# Patient Record
Sex: FEMALE | Race: WHITE | URBAN - METROPOLITAN AREA
[De-identification: names, ages, dates, MRNs, and addresses within clinical notes are randomized per-mention and may not be internally consistent; named-entity substitution may affect disease eponyms.]

---

## 2023-03-13 ENCOUNTER — OFFICE VISIT (OUTPATIENT)
Dept: PRIMARY CARE | Facility: CLINIC | Age: 53
End: 2023-03-13
Payer: COMMERCIAL

## 2023-03-13 ENCOUNTER — DOCUMENTATION (OUTPATIENT)
Dept: PRIMARY CARE | Facility: CLINIC | Age: 53
End: 2023-03-13

## 2023-03-13 VITALS
TEMPERATURE: 97.7 F | BODY MASS INDEX: 23.52 KG/M2 | DIASTOLIC BLOOD PRESSURE: 70 MMHG | OXYGEN SATURATION: 92 % | SYSTOLIC BLOOD PRESSURE: 118 MMHG | HEART RATE: 70 BPM | WEIGHT: 137 LBS

## 2023-03-13 DIAGNOSIS — B96.89 ACUTE BACTERIAL RHINOSINUSITIS: Primary | ICD-10-CM

## 2023-03-13 DIAGNOSIS — J01.90 ACUTE BACTERIAL RHINOSINUSITIS: Primary | ICD-10-CM

## 2023-03-13 PROBLEM — H69.90 EUSTACHIAN TUBE DYSFUNCTION: Status: ACTIVE | Noted: 2023-03-13

## 2023-03-13 PROBLEM — F51.02 ADJUSTMENT INSOMNIA: Status: ACTIVE | Noted: 2023-03-13

## 2023-03-13 PROBLEM — R21 SKIN RASH: Status: ACTIVE | Noted: 2023-03-13

## 2023-03-13 PROBLEM — S02.2XXA CLOSED FRACTURE OF NASAL BONES: Status: ACTIVE | Noted: 2023-03-13

## 2023-03-13 PROBLEM — G47.30 SLEEP-DISORDERED BREATHING: Status: ACTIVE | Noted: 2023-03-13

## 2023-03-13 PROBLEM — J31.0 CHRONIC RHINITIS: Status: ACTIVE | Noted: 2023-03-13

## 2023-03-13 PROBLEM — R05.9 COUGH: Status: ACTIVE | Noted: 2023-03-13

## 2023-03-13 PROBLEM — G47.33 OBSTRUCTIVE SLEEP APNEA OF ADULT: Status: ACTIVE | Noted: 2023-03-13

## 2023-03-13 PROBLEM — R06.83 PRIMARY SNORING: Status: ACTIVE | Noted: 2023-03-13

## 2023-03-13 PROCEDURE — 99213 OFFICE O/P EST LOW 20 MIN: CPT | Performed by: STUDENT IN AN ORGANIZED HEALTH CARE EDUCATION/TRAINING PROGRAM

## 2023-03-13 RX ORDER — AMOXICILLIN AND CLAVULANATE POTASSIUM 875; 125 MG/1; MG/1
875 TABLET, FILM COATED ORAL 2 TIMES DAILY
Qty: 14 TABLET | Refills: 0 | Status: SHIPPED | OUTPATIENT
Start: 2023-03-13 | End: 2023-03-20

## 2023-03-13 RX ORDER — ALBUTEROL SULFATE 90 UG/1
1-2 AEROSOL, METERED RESPIRATORY (INHALATION) EVERY 4 HOURS PRN
COMMUNITY
Start: 2022-11-11

## 2023-03-13 RX ORDER — BENZONATATE 200 MG/1
1 CAPSULE ORAL 3 TIMES DAILY PRN
COMMUNITY
Start: 2022-11-11

## 2023-03-13 RX ORDER — AZELASTINE 1 MG/ML
2 SPRAY, METERED NASAL DAILY
COMMUNITY
Start: 2021-03-02 | End: 2023-07-31 | Stop reason: SDUPTHER

## 2023-03-13 RX ORDER — ESTRADIOL 0.05 MG/D
1 FILM, EXTENDED RELEASE TRANSDERMAL 2 TIMES WEEKLY
COMMUNITY
Start: 2019-10-21

## 2023-03-13 RX ORDER — MULTIVITAMIN
TABLET ORAL
COMMUNITY

## 2023-03-13 RX ORDER — TRIAMCINOLONE ACETONIDE 1 MG/G
CREAM TOPICAL
COMMUNITY
Start: 2021-11-01

## 2023-03-13 NOTE — PROGRESS NOTES
Subjective   Patient ID: Kellie E Schwabl is a 52 y.o. female patient of Dr. Boateng with history of hysterectomy/BSO on transdermal estradiol kaveh (follows with GYN) and previous sinus infection who presents for Nasal Congestion.    HPI  Initially had nasal congestion that progressed to sinus pressure for past week. Now having drainage down the back of her throat and spitting out green mucous. Also having pressure in ears. No SOB. No fevers. Feels tired.     Taking advil cold and sinus, nasacort.     Responded well to augmentin in the past.    No sick contacts.    Objective   Visit Vitals  /70   Pulse 70   Temp 36.5 °C (97.7 °F)   Wt 62.1 kg (137 lb)   SpO2 92%   BMI 23.52 kg/m²   Smoking Status Never Assessed   BSA 1.67 m²      Physical Exam  General: Tired appearing, nasal voice, conversational, in no acute distress  HEENT: EOMI, PERRL, nasal congestion with swollen turbinates, MMM, clear mucous in oropharynx, maxillary sinuses tender to palpation, L TM with good light reflex, R TM with small amount of pus behind it, nonbulging  CV: RRR, no murmurs  Resp: Lungs CTAB, normal work of breathing  GI: Soft, nondistended, nontender, BS+   Ext: No lower ext swelling  Skin: Warm, dry, no rashes  Neuro: Awake, alert, oriented x3, moving all 4 extremities, nonfocal, normal gait, ambulates without assistance  Psych: Appropriate mood and affect          Assessment/Plan   Kellie E Schwabl is a 52 y.o. female who presents for Nasal Congestion and sinus pressure for over 7 days concerning for developing bacterial sinusitis.     Problem List Items Addressed This Visit    None  Visit Diagnoses       Acute bacterial rhinosinusitis    -  Primary    Relevant Medications    amoxicillin-pot clavulanate (Augmentin) 875-125 mg tablet                 Arabella Spivey MD

## 2023-03-13 NOTE — PATIENT INSTRUCTIONS
It was nice to see you today Susan.    For your sinus infection and developing ear infection on the right, please take augmentin twice a day for 7 days. You can also use your nasal spray to help open up the passages.    Feel better soon!

## 2023-03-13 NOTE — PROGRESS NOTES
An interactive audio and video telecommunication system which permits real time communications between the patient (at the originating site) and provider (at the distant site) was utilized to provide this telehealth service.    Subjective   Patient ID:   Kellie E Schwabl is a 52 y.o. female,  hx h/o of hysterectomy/BSO on transdermal estradiol patch (follows up with OBGYN) , of who presents for Sinus Problem (And right ear pain)    Concern sinus symptoms and ear pain  - onset 3/7/23  + some cough  - no rhinorrhea  + drain on the back to the throat  - no fever, no chills   - no nausea/vomiting  - no shortness of breath  - no known sick contact   + maxillary pressure  + worsening  in the past 7 days  + right ear pain, significantly painful   + s/p flu vaccination in 10/23, s/p COVID vaccination, at the same time on 10/23  + trial of Advil cold and sinus ,and + Nasacort    All other (10) organ systems have been reviewed, negative for significant complaint and no change from baseline other than mentioned as per HPI above.    Past Medical History:  2016: Encounter for nonprocreative screening for genetic   disease carrier status      Comment:  BRCA negative    Past Surgical History:   Procedure Laterality Date     SECTION, CLASSIC  2016     Section    CHOLECYSTECTOMY  2016    Cholecystectomy Laparoscopic    OOPHORECTOMY  2016    Oophorectomy - Unilateral (Removal Of One Ovary)    OTHER SURGICAL HISTORY  2016    Hysterectomy Robotic-Assisted    OTHER SURGICAL HISTORY  2016    Neuroplasty With Transposition Of Ulnar Nerve - At Elbow    TUBAL LIGATION  2016    Tubal Ligation       No family history on file.    Social History    has no history on file for tobacco use, alcohol use, and drug use.  Not on File  Never smoker no drug use,  no etoh       No current outpatient medications on file prior to visit.     No current facility-administered medications on file prior  to visit.       Objective     Limited exam on telehealth visit  Physical Exam  Constitutional:       General: She is awake.      Appearance: Normal appearance. She is not ill-appearing or toxic-appearing.   Pulmonary:      Effort: Pulmonary effort is normal. No respiratory distress.   Neurological:      General: No focal deficit present.      Mental Status: She is alert.   Psychiatric:         Attention and Perception: Attention normal.         Mood and Affect: Mood normal.         Speech: Speech normal.         Assessment/Plan     Kellie E Schwabl is a 52 y.o. female,  hx h/o of hysterectomy/BSO on transdermal estradiol patch (follows up with OBGYN) , of who presents for Sinus Problem (And right ear pain)    - You have congestions, runny nose, and sinus pressure however it has been less than 10 days. For symptoms that are less than 10 days it is still presumed to be viral etiology.   -  However, patient is complaining of right ear pain, and cannot wait 10 days  - advised to be seen in urgent care or PCP office for examination of the right ear   - Patient would like to cancel the visit     This visit was completed via telehealth. All issues above were discussed and addressed but no physical exam was performed. If it was felt that the patient should be evaluated in clinic then they were directed there. The patient verbally consented to visit.

## 2023-05-08 ENCOUNTER — OFFICE VISIT (OUTPATIENT)
Dept: PRIMARY CARE | Facility: CLINIC | Age: 53
End: 2023-05-08
Payer: COMMERCIAL

## 2023-05-08 DIAGNOSIS — Z94.83 DYSURIA AFTER PANCREAS TRANSPLANT USING BLADDER DRAINAGE TECHNIQUE (BDT) (MULTI): Primary | ICD-10-CM

## 2023-05-08 DIAGNOSIS — R30.0 DYSURIA AFTER PANCREAS TRANSPLANT USING BLADDER DRAINAGE TECHNIQUE (BDT) (MULTI): Primary | ICD-10-CM

## 2023-05-08 DIAGNOSIS — R30.0 DYSURIA: ICD-10-CM

## 2023-05-08 LAB
APPEARANCE, URINE: CLEAR
BILIRUBIN, URINE: NEGATIVE
BLOOD, URINE: ABNORMAL
COLOR, URINE: ABNORMAL
GLUCOSE, URINE: NEGATIVE MG/DL
KETONES, URINE: NEGATIVE MG/DL
LEUKOCYTE ESTERASE, URINE: NEGATIVE
NITRITE, URINE: NEGATIVE
PH, URINE: 5 (ref 5–8)
PROTEIN, URINE: NEGATIVE MG/DL
RBC, URINE: 1 /HPF (ref 0–5)
SPECIFIC GRAVITY, URINE: 1 (ref 1–1.03)
SQUAMOUS EPITHELIAL CELLS, URINE: 2 /HPF
UROBILINOGEN, URINE: <2 MG/DL (ref 0–1.9)
WBC, URINE: 1 /HPF (ref 0–5)

## 2023-05-08 PROCEDURE — 81001 URINALYSIS AUTO W/SCOPE: CPT

## 2023-05-08 PROCEDURE — 87086 URINE CULTURE/COLONY COUNT: CPT

## 2023-05-08 PROCEDURE — 99213 OFFICE O/P EST LOW 20 MIN: CPT | Performed by: INTERNAL MEDICINE

## 2023-05-08 RX ORDER — CIPROFLOXACIN 500 MG/1
500 TABLET ORAL 2 TIMES DAILY
Qty: 14 TABLET | Refills: 0 | Status: SHIPPED | OUTPATIENT
Start: 2023-05-08 | End: 2023-05-15

## 2023-05-08 RX ORDER — FLUCONAZOLE 150 MG/1
TABLET ORAL
Qty: 1 TABLET | Refills: 0 | Status: SHIPPED | OUTPATIENT
Start: 2023-05-08

## 2023-05-08 NOTE — PROGRESS NOTES
Kellie Schwabl presents for acute visit.     1. Irriativwe urinary symptoms   -started this wknd, while traveling, with less h20 intake than usual   +burning, noted blood in urine   -some back pain but wonders if MSK     2.  on transdermal estrogen s/p KATIE     #HM   -tdap 2018, shingrix x2  -screen labs 5.22   -mammo 4.22 with gyne   -cscope 2.21   -normal PSG in 5.21        Gen alert well appearing nad     A/P   Suspect cystitis but cannot ro pyelonephritis given gross hematuria and flank pain. UA and culture. Cipro 500 BID.

## 2023-05-09 LAB — URINE CULTURE: NORMAL

## 2023-06-27 ENCOUNTER — LAB (OUTPATIENT)
Dept: LAB | Facility: LAB | Age: 53
End: 2023-06-27
Payer: COMMERCIAL

## 2023-06-27 ENCOUNTER — OFFICE VISIT (OUTPATIENT)
Dept: PRIMARY CARE | Facility: CLINIC | Age: 53
End: 2023-06-27
Payer: COMMERCIAL

## 2023-06-27 DIAGNOSIS — R10.9 ACUTE FLANK PAIN: ICD-10-CM

## 2023-06-27 DIAGNOSIS — N20.0 NEPHROLITHIASIS: Primary | ICD-10-CM

## 2023-06-27 DIAGNOSIS — R10.9 ACUTE FLANK PAIN: Primary | ICD-10-CM

## 2023-06-27 LAB
ALBUMIN (G/DL) IN SER/PLAS: 4.7 G/DL (ref 3.4–5)
ANION GAP IN SER/PLAS: 13 MMOL/L (ref 10–20)
APPEARANCE, URINE: CLEAR
BACTERIA, URINE: ABNORMAL /HPF
BASOPHILS (10*3/UL) IN BLOOD BY AUTOMATED COUNT: 0.04 X10E9/L (ref 0–0.1)
BASOPHILS/100 LEUKOCYTES IN BLOOD BY AUTOMATED COUNT: 0.9 % (ref 0–2)
BILIRUBIN, URINE: NEGATIVE
BLOOD, URINE: ABNORMAL
CALCIUM (MG/DL) IN SER/PLAS: 9.6 MG/DL (ref 8.6–10.6)
CARBON DIOXIDE, TOTAL (MMOL/L) IN SER/PLAS: 29 MMOL/L (ref 21–32)
CHLORIDE (MMOL/L) IN SER/PLAS: 103 MMOL/L (ref 98–107)
COLOR, URINE: COLORLESS
CREATININE (MG/DL) IN SER/PLAS: 0.62 MG/DL (ref 0.5–1.05)
EOSINOPHILS (10*3/UL) IN BLOOD BY AUTOMATED COUNT: 0.11 X10E9/L (ref 0–0.7)
EOSINOPHILS/100 LEUKOCYTES IN BLOOD BY AUTOMATED COUNT: 2.5 % (ref 0–6)
ERYTHROCYTE DISTRIBUTION WIDTH (RATIO) BY AUTOMATED COUNT: 11.9 % (ref 11.5–14.5)
ERYTHROCYTE MEAN CORPUSCULAR HEMOGLOBIN CONCENTRATION (G/DL) BY AUTOMATED: 33.7 G/DL (ref 32–36)
ERYTHROCYTE MEAN CORPUSCULAR VOLUME (FL) BY AUTOMATED COUNT: 94 FL (ref 80–100)
ERYTHROCYTES (10*6/UL) IN BLOOD BY AUTOMATED COUNT: 4.47 X10E12/L (ref 4–5.2)
GFR FEMALE: >90 ML/MIN/1.73M2
GLUCOSE (MG/DL) IN SER/PLAS: 96 MG/DL (ref 74–99)
GLUCOSE, URINE: NEGATIVE MG/DL
HEMATOCRIT (%) IN BLOOD BY AUTOMATED COUNT: 41.8 % (ref 36–46)
HEMOGLOBIN (G/DL) IN BLOOD: 14.1 G/DL (ref 12–16)
IMMATURE GRANULOCYTES/100 LEUKOCYTES IN BLOOD BY AUTOMATED COUNT: 0.2 % (ref 0–0.9)
KETONES, URINE: NEGATIVE MG/DL
LEUKOCYTE ESTERASE, URINE: NEGATIVE
LEUKOCYTES (10*3/UL) IN BLOOD BY AUTOMATED COUNT: 4.4 X10E9/L (ref 4.4–11.3)
LYMPHOCYTES (10*3/UL) IN BLOOD BY AUTOMATED COUNT: 1.81 X10E9/L (ref 1.2–4.8)
LYMPHOCYTES/100 LEUKOCYTES IN BLOOD BY AUTOMATED COUNT: 41.1 % (ref 13–44)
MONOCYTES (10*3/UL) IN BLOOD BY AUTOMATED COUNT: 0.37 X10E9/L (ref 0.1–1)
MONOCYTES/100 LEUKOCYTES IN BLOOD BY AUTOMATED COUNT: 8.4 % (ref 2–10)
NEUTROPHILS (10*3/UL) IN BLOOD BY AUTOMATED COUNT: 2.06 X10E9/L (ref 1.2–7.7)
NEUTROPHILS/100 LEUKOCYTES IN BLOOD BY AUTOMATED COUNT: 46.9 % (ref 40–80)
NITRITE, URINE: NEGATIVE
NRBC (PER 100 WBCS) BY AUTOMATED COUNT: 0 /100 WBC (ref 0–0)
PH, URINE: 6 (ref 5–8)
PHOSPHATE (MG/DL) IN SER/PLAS: 3.8 MG/DL (ref 2.5–4.9)
PLATELETS (10*3/UL) IN BLOOD AUTOMATED COUNT: 256 X10E9/L (ref 150–450)
POTASSIUM (MMOL/L) IN SER/PLAS: 4 MMOL/L (ref 3.5–5.3)
PROTEIN, URINE: NEGATIVE MG/DL
RBC, URINE: 1 /HPF (ref 0–5)
SODIUM (MMOL/L) IN SER/PLAS: 141 MMOL/L (ref 136–145)
SPECIFIC GRAVITY, URINE: 1 (ref 1–1.03)
SQUAMOUS EPITHELIAL CELLS, URINE: <1 /HPF
UREA NITROGEN (MG/DL) IN SER/PLAS: 13 MG/DL (ref 6–23)
UROBILINOGEN, URINE: <2 MG/DL (ref 0–1.9)
WBC, URINE: 1 /HPF (ref 0–5)

## 2023-06-27 PROCEDURE — 81001 URINALYSIS AUTO W/SCOPE: CPT

## 2023-06-27 PROCEDURE — 85025 COMPLETE CBC W/AUTO DIFF WBC: CPT

## 2023-06-27 PROCEDURE — 80069 RENAL FUNCTION PANEL: CPT

## 2023-06-27 PROCEDURE — 36415 COLL VENOUS BLD VENIPUNCTURE: CPT

## 2023-06-27 PROCEDURE — 99214 OFFICE O/P EST MOD 30 MIN: CPT | Performed by: INTERNAL MEDICINE

## 2023-06-27 PROCEDURE — 87086 URINE CULTURE/COLONY COUNT: CPT

## 2023-06-27 RX ORDER — TAMSULOSIN HYDROCHLORIDE 0.4 MG/1
0.4 CAPSULE ORAL DAILY
Qty: 30 CAPSULE | Refills: 11 | Status: SHIPPED | OUTPATIENT
Start: 2023-06-27 | End: 2023-07-20

## 2023-06-27 NOTE — PROGRESS NOTES
Susan presents with concern for UTI. She presented similarly in May with irritative urinary symptoms but these were fairly classic and resolved with empiric abx, though urine culture proved sterile. Today she presents with 2-3 days of L flank pain, colicky in nature, with radiation into the groin. She denies byron UTI sxs. She denies fever, malaise. No h/o nephrolithiasis in the past.     ROS:   Gen neg fever neg malaise    neg dysuria neg hematuria     Exam with mild L CVAT no abd TTP nontoxic appearing     A/P   This is a case of acute L flank pain radiating into groin concerning for nephrolithiasis.   -UA, culture, CBC, RFP and stat CT AP   -discussed hydration, ibuprofen 800 TID PRN pain with meals   -further mgmt as per CT and labs/urine

## 2023-06-28 LAB — URINE CULTURE: NORMAL

## 2023-07-06 DIAGNOSIS — N20.0 NEPHROLITHIASIS: Primary | ICD-10-CM

## 2023-07-19 DIAGNOSIS — N20.0 NEPHROLITHIASIS: ICD-10-CM

## 2023-07-20 RX ORDER — TAMSULOSIN HYDROCHLORIDE 0.4 MG/1
CAPSULE ORAL
Qty: 30 CAPSULE | Refills: 11 | Status: SHIPPED | OUTPATIENT
Start: 2023-07-20

## 2023-07-25 ENCOUNTER — OFFICE VISIT (OUTPATIENT)
Dept: PRIMARY CARE | Facility: CLINIC | Age: 53
End: 2023-07-25
Payer: COMMERCIAL

## 2023-07-25 DIAGNOSIS — N20.0 NEPHROLITHIASIS: Primary | ICD-10-CM

## 2023-07-25 PROCEDURE — 99213 OFFICE O/P EST LOW 20 MIN: CPT | Performed by: INTERNAL MEDICINE

## 2023-07-25 NOTE — PROGRESS NOTES
Kellie Schwabl is a 53 yo F presenting in FU.     1. Nephrolithiasis 6.2023.   -distal L ureter 7 mm without hydro at time of 6.27.23 visit with me   -does not feel has passed a stone, does not resolved pain and has 30 days supply of tamsulosin rx'ed 6.27.23 almost now done   -has FU with uro pending     2.  on transdermal estrogen (h/o KATIE)     3. Pulm nodule potentially on 6.23 CT AP rec'ed FU     #HM   -tdap 2018   -shingrix x2   -cscope 2.21   -normal psg 5.21   -mammo due   -cbc/cmp 6.23       ROS:    neg dysuria neg hematuria   Gen neg fever neg malaise     O   VSS   Exam without CVAT well appearing       A/P   -obtain renal US   -complete 30 days tamsulosin   -Uro FU

## 2023-07-28 ENCOUNTER — TELEPHONE (OUTPATIENT)
Dept: PRIMARY CARE | Facility: CLINIC | Age: 53
End: 2023-07-28
Payer: COMMERCIAL

## 2023-07-31 DIAGNOSIS — J01.90 ACUTE BACTERIAL RHINOSINUSITIS: Primary | ICD-10-CM

## 2023-07-31 DIAGNOSIS — B96.89 ACUTE BACTERIAL RHINOSINUSITIS: Primary | ICD-10-CM

## 2023-07-31 RX ORDER — AZELASTINE 1 MG/ML
2 SPRAY, METERED NASAL DAILY
Qty: 30 ML | Refills: 11 | Status: SHIPPED | OUTPATIENT
Start: 2023-07-31

## 2023-10-20 ENCOUNTER — ANCILLARY PROCEDURE (OUTPATIENT)
Dept: RADIOLOGY | Facility: CLINIC | Age: 53
End: 2023-10-20
Payer: COMMERCIAL

## 2023-10-20 DIAGNOSIS — Z12.39 ENCOUNTER FOR OTHER SCREENING FOR MALIGNANT NEOPLASM OF BREAST: ICD-10-CM

## 2023-10-20 PROCEDURE — 77063 BREAST TOMOSYNTHESIS BI: CPT | Mod: BILATERAL PROCEDURE | Performed by: RADIOLOGY

## 2023-10-20 PROCEDURE — 77063 BREAST TOMOSYNTHESIS BI: CPT | Mod: 50

## 2023-10-20 PROCEDURE — 77067 SCR MAMMO BI INCL CAD: CPT | Mod: BILATERAL PROCEDURE | Performed by: RADIOLOGY

## 2023-11-01 ENCOUNTER — APPOINTMENT (OUTPATIENT)
Dept: PRIMARY CARE | Facility: CLINIC | Age: 53
End: 2023-11-01
Payer: COMMERCIAL

## 2025-01-01 ENCOUNTER — TELEMEDICINE (OUTPATIENT)
Dept: PRIMARY CARE | Facility: CLINIC | Age: 55
End: 2025-01-01
Payer: COMMERCIAL

## 2025-01-01 DIAGNOSIS — H10.12: Primary | ICD-10-CM

## 2025-01-01 PROCEDURE — 99213 OFFICE O/P EST LOW 20 MIN: CPT

## 2025-01-01 RX ORDER — OFLOXACIN 3 MG/ML
1 SOLUTION/ DROPS OPHTHALMIC 4 TIMES DAILY
Qty: 5 ML | Refills: 0 | Status: SHIPPED | OUTPATIENT
Start: 2025-01-01

## 2025-01-01 ASSESSMENT — ENCOUNTER SYMPTOMS
DOUBLE VISION: 0
EYE PAIN: 0
EYE REDNESS: 1
EYE DISCHARGE: 1
EYE ITCHING: 1

## 2025-01-01 NOTE — PATIENT INSTRUCTIONS
"Please take eye drops 4 times per day for the next 5 days    Remember to wipe down all the high touch areas in the house which include but are not limited to door handle on the fridge, handle on the toilet, door knobs of house, bathrooms, and bed rooms.     What is pink eye?  Pink eye is a term people use to describe an infection or irritation of the eye. The medical term for pink eye is \"conjunctivitis.\"  If you have pink eye, your eye (or eyes) might:  ?Turn pink or red  ?Weep or ooze a gooey liquid  ?Become itchy or burn  ?Get stuck shut, especially when you first wake up  Pink eye can be caused by an infection, allergies, or an unknown irritation    When can I go back to work or school?  If you have pink eye caused by an infection, remember that it can spread very easily. The best way to avoid spreading it is to stay away from other people until you no longer have symptoms. If this is not possible, wash your hands often, It's also important to avoid touching your eyes and sharing items that could spread the infection.  Schools and day cares usually have rules about when a child with pink eye can return. If a child has a bacterial infection, they will probably need to stay home until they have gotten antibiotic eye drops or ointment for 24 hours.   "

## 2025-01-01 NOTE — PROGRESS NOTES
On Demand Virtual Visit Patient Consent     This visit was completed via video conference. All issues as below were discussed and addressed but no physical exam was performed. If it was felt that the patient should be evaluated in clinic than they were directed there. The patient verbally consented to the visit.    An interactive audio and video telecommunication system which permits real time communications between the patient (at the originating site) and provider (at the distant site) was utilized to provide this telehealth service.   Verbal consent was requested and obtained from Kellie E Schwabl (or parent if under 18) 01/01/25 for a telehealth visit.   I have verbally confirmed with Kellie E Schwabl (or parent if under 18) that they are physically located in the [facility] during this virtual visit.    Subjective   Patient ID: Kellie E Schwabl is a 54 y.o. female who presents for Conjunctivitis.  Conjunctivitis   The current episode started yesterday. The onset was sudden. The problem occurs continuously. The problem has been gradually worsening. Nothing relieves the symptoms. Nothing aggravates the symptoms. Associated symptoms include eye itching, eye discharge and eye redness. Pertinent negatives include no decreased vision, no double vision, no ear discharge, no ear pain and no eye pain. The left eye is affected. The eye pain is not associated with movement. The eyelid exhibits no abnormality.       Review of Systems   HENT:  Negative for ear discharge and ear pain.    Eyes:  Positive for discharge, redness and itching. Negative for double vision and pain.       Objective     There were no vitals taken for this visit.       Physical Exam  Constitutional:       General: She is not in acute distress.     Appearance: She is normal weight. She is not ill-appearing.   Eyes:      General: Lids are normal.      Conjunctiva/sclera:      Right eye: Right conjunctiva is not injected.      Left eye: Left  conjunctiva is injected. Exudate present.   Pulmonary:      Effort: Pulmonary effort is normal.   Neurological:      Mental Status: She is alert.       Pt seen via video feed to be in no acute distress  Reviewed use of otc/supportive care and sent via pt instruct   All questions were answered and need for follow-up/in person care was reviewed.      Assessment/Plan   Susan was seen today for conjunctivitis.  Diagnoses and all orders for this visit:  Acute atopic conjunctivitis, left  -     ofloxacin (Ocuflox) 0.3 % ophthalmic solution; Administer 1 drop into the left eye 4 times a day.

## 2025-01-02 ENCOUNTER — APPOINTMENT (OUTPATIENT)
Dept: OBSTETRICS AND GYNECOLOGY | Facility: CLINIC | Age: 55
End: 2025-01-02
Payer: COMMERCIAL

## 2025-01-22 ENCOUNTER — APPOINTMENT (OUTPATIENT)
Dept: OBSTETRICS AND GYNECOLOGY | Facility: CLINIC | Age: 55
End: 2025-01-22
Payer: COMMERCIAL

## 2025-01-22 VITALS
SYSTOLIC BLOOD PRESSURE: 120 MMHG | DIASTOLIC BLOOD PRESSURE: 68 MMHG | BODY MASS INDEX: 23.73 KG/M2 | WEIGHT: 139 LBS | HEIGHT: 64 IN

## 2025-01-22 DIAGNOSIS — Z01.419 WELL WOMAN EXAM: Primary | ICD-10-CM

## 2025-01-22 DIAGNOSIS — Z79.890 HORMONE REPLACEMENT THERAPY: Primary | ICD-10-CM

## 2025-01-22 DIAGNOSIS — E89.40 ASYMPTOMATIC POSTPROCEDURAL OVARIAN FAILURE: ICD-10-CM

## 2025-01-22 DIAGNOSIS — Z12.31 BREAST CANCER SCREENING BY MAMMOGRAM: ICD-10-CM

## 2025-01-22 DIAGNOSIS — Z79.890 HORMONE REPLACEMENT THERAPY: ICD-10-CM

## 2025-01-22 PROCEDURE — 3008F BODY MASS INDEX DOCD: CPT | Performed by: OBSTETRICS & GYNECOLOGY

## 2025-01-22 PROCEDURE — 99396 PREV VISIT EST AGE 40-64: CPT | Performed by: OBSTETRICS & GYNECOLOGY

## 2025-01-22 PROCEDURE — 1036F TOBACCO NON-USER: CPT | Performed by: OBSTETRICS & GYNECOLOGY

## 2025-01-22 RX ORDER — ESTRADIOL 0.05 MG/D
1 FILM, EXTENDED RELEASE TRANSDERMAL WEEKLY
Qty: 12 PATCH | Refills: 3 | Status: SHIPPED | OUTPATIENT
Start: 2025-01-22 | End: 2026-01-22

## 2025-01-22 RX ORDER — ESTRADIOL 0.05 MG/D
1 FILM, EXTENDED RELEASE TRANSDERMAL 2 TIMES WEEKLY
Qty: 24 PATCH | Refills: 3 | Status: SHIPPED | OUTPATIENT
Start: 2025-01-23 | End: 2026-01-23

## 2025-01-22 ASSESSMENT — ENCOUNTER SYMPTOMS
COLOR CHANGE: 0
VOMITING: 0
SLEEP DISTURBANCE: 0
ABDOMINAL PAIN: 0
HEMATURIA: 0
FREQUENCY: 0
SHORTNESS OF BREATH: 0
FLANK PAIN: 0
BLOOD IN STOOL: 0
CONSTIPATION: 0
NAUSEA: 0
ABDOMINAL DISTENTION: 0
APPETITE CHANGE: 0
CHILLS: 0
FEVER: 0
FATIGUE: 0
BACK PAIN: 0
UNEXPECTED WEIGHT CHANGE: 0
DYSURIA: 0
DIARRHEA: 0

## 2025-01-22 ASSESSMENT — PAIN SCALES - GENERAL: PAINLEVEL_OUTOF10: 0-NO PAIN

## 2025-01-22 NOTE — PROGRESS NOTES
"History Of Present Illness  Routine Gyn Exam  Susan E Schwabl here for routine WWE.  Pt is postmenopausal.  Denies spotting or bleeding.   Pt s/p hysterectomy and subsequent BSO (around ).  Currently using estradiol patch.     Concerns: none.     Medical and surgical histories reviewed with patient.   Exercise: recently started with a  - weights, cardio, yoga .  2 daughters     Gynecologic History  Postmenopausal.  Sexually active: Yes with one male partner/ .  Last Pap: hysterectomy .   Last mammogram: .   Last Colonoscopy:  .       Obstetric History  OB History    Para Term  AB Living   2         2   SAB IAB Ectopic Multiple Live Births                  # Outcome Date GA Lbr Henry/2nd Weight Sex Type Anes PTL Lv   2             1                  Review of Systems   Constitutional:  Negative for appetite change, chills, fatigue, fever and unexpected weight change.   Respiratory:  Negative for shortness of breath.    Cardiovascular:  Negative for chest pain.   Gastrointestinal:  Negative for abdominal distention, abdominal pain, blood in stool, constipation, diarrhea, nausea and vomiting.   Endocrine: Negative for cold intolerance and heat intolerance.   Genitourinary:  Negative for dyspareunia, dysuria, flank pain, frequency, genital sores, hematuria, menstrual problem, pelvic pain, urgency, vaginal bleeding, vaginal discharge and vaginal pain.   Musculoskeletal:  Negative for back pain.   Skin:  Negative for color change.   Psychiatric/Behavioral:  Negative for sleep disturbance.        /68 (BP Location: Left arm, Patient Position: Sitting)   Ht 1.626 m (5' 4\")   Wt 63 kg (139 lb)   LMP  (LMP Unknown)   BMI 23.86 kg/m²      Physical Exam  Constitutional:       Appearance: Normal appearance.   HENT:      Head: Normocephalic and atraumatic.   Chest:   Breasts:     Right: Normal.      Left: Normal.   Abdominal:      General: Abdomen is flat.     "  Palpations: Abdomen is soft.      Tenderness: There is no abdominal tenderness.   Genitourinary:     General: Normal vulva.      Vagina: Normal.      Uterus: Absent.       Adnexa: Right adnexa normal and left adnexa normal.      Comments: Cervix and uterus surgically absent  Vaginal cuff wnl     Skin:     General: Skin is warm and dry.   Neurological:      Mental Status: She is alert and oriented to person, place, and time.   Psychiatric:         Mood and Affect: Mood normal.              Assessment/Plan         Routine Well Woman Exam Today  Discussed diet and exercise.   Reviewed routine health screenings.   Pap: Pap no longer needed due to history of hysterectomy for benign reasons. Pt denies history of cervical dysplasia.     Recommend annual mammograms. Pt overdue, last mammogram 2023.  Order placed and patient will schedule.    Surgical menopause  Pt plans to continue estradiol patch for now. Rx sent.                Brenda Muniz MD

## 2025-02-16 ENCOUNTER — OFFICE VISIT (OUTPATIENT)
Dept: URGENT CARE | Age: 55
End: 2025-02-16
Payer: COMMERCIAL

## 2025-02-16 VITALS
DIASTOLIC BLOOD PRESSURE: 76 MMHG | HEART RATE: 58 BPM | WEIGHT: 135 LBS | BODY MASS INDEX: 23.05 KG/M2 | OXYGEN SATURATION: 95 % | TEMPERATURE: 97.7 F | HEIGHT: 64 IN | SYSTOLIC BLOOD PRESSURE: 121 MMHG

## 2025-02-16 DIAGNOSIS — J10.1 INFLUENZA A: Primary | ICD-10-CM

## 2025-02-16 DIAGNOSIS — R05.1 ACUTE COUGH: ICD-10-CM

## 2025-02-16 LAB
POC RAPID INFLUENZA A: POSITIVE
POC RAPID INFLUENZA B: NEGATIVE
POC SARS-COV-2 AG BINAX: NORMAL

## 2025-02-16 RX ORDER — BENZONATATE 200 MG/1
200 CAPSULE ORAL 3 TIMES DAILY PRN
Qty: 30 CAPSULE | Refills: 0 | Status: SHIPPED | OUTPATIENT
Start: 2025-02-16 | End: 2025-02-23

## 2025-02-16 RX ORDER — OSELTAMIVIR PHOSPHATE 75 MG/1
75 CAPSULE ORAL EVERY 12 HOURS
Qty: 10 CAPSULE | Refills: 0 | Status: SHIPPED | OUTPATIENT
Start: 2025-02-16 | End: 2025-02-16

## 2025-02-16 RX ORDER — OSELTAMIVIR PHOSPHATE 75 MG/1
75 CAPSULE ORAL EVERY 12 HOURS
Qty: 10 CAPSULE | Refills: 0 | Status: SHIPPED | OUTPATIENT
Start: 2025-02-16 | End: 2025-02-21

## 2025-02-16 RX ORDER — GUAIFENESIN 1200 MG/1
1200 TABLET, EXTENDED RELEASE ORAL EVERY 12 HOURS PRN
Qty: 14 TABLET | Refills: 0 | Status: SHIPPED | OUTPATIENT
Start: 2025-02-16 | End: 2025-02-23

## 2025-02-16 ASSESSMENT — ENCOUNTER SYMPTOMS: COUGH: 1

## 2025-02-16 NOTE — PROGRESS NOTES
"Subjective   Patient ID: Kellie E Schwabl is a 54 y.o. female. They present today with a chief complaint of Cough (Symptoms for 4 days. ).    History of Present Illness    Cough        Past Medical History  Allergies as of 2025 - Reviewed 2025   Allergen Reaction Noted    Codeine Other 2025    Erythromycin Nausea/vomiting 2025       (Not in a hospital admission)       Past Medical History:   Diagnosis Date    Encounter for nonprocreative screening for genetic disease carrier status 2016    BRCA negative       Past Surgical History:   Procedure Laterality Date     SECTION, CLASSIC  2016     Section    CHOLECYSTECTOMY  2016    Cholecystectomy Laparoscopic    OOPHORECTOMY  2016    Oophorectomy - Unilateral (Removal Of One Ovary)    OTHER SURGICAL HISTORY  2016    Hysterectomy Robotic-Assisted    OTHER SURGICAL HISTORY  2016    Neuroplasty With Transposition Of Ulnar Nerve - At Elbow    TUBAL LIGATION  2016    Tubal Ligation        reports that she has never smoked. She has never been exposed to tobacco smoke. She has never used smokeless tobacco. She reports current alcohol use of about 2.0 standard drinks of alcohol per week. She reports that she does not use drugs.    Review of Systems  Review of Systems   Respiratory:  Positive for cough.    All other systems reviewed and are negative.                                 Objective    Vitals:    25 0802   BP: 121/76   BP Location: Left arm   Patient Position: Sitting   BP Cuff Size: Adult   Pulse: 58   Temp: 36.5 °C (97.7 °F)   TempSrc: Oral   SpO2: 95%   Weight: 61.2 kg (135 lb)   Height: 1.626 m (5' 4\")     No LMP recorded (lmp unknown). Patient has had a hysterectomy.    Physical Exam  Vitals and nursing note reviewed.   Constitutional:       Appearance: Normal appearance.   HENT:      Head: Normocephalic.      Right Ear: Tympanic membrane normal.      Left Ear: Tympanic membrane " normal.      Nose: Nose normal.      Mouth/Throat:      Mouth: Mucous membranes are dry.      Pharynx: Oropharynx is clear.   Eyes:      Extraocular Movements: Extraocular movements intact.      Pupils: Pupils are equal, round, and reactive to light.   Cardiovascular:      Rate and Rhythm: Normal rate and regular rhythm.      Pulses: Normal pulses.      Heart sounds: Normal heart sounds.   Pulmonary:      Effort: Pulmonary effort is normal.      Breath sounds: Normal breath sounds and air entry. No decreased breath sounds, wheezing or rhonchi.   Musculoskeletal:         General: Normal range of motion.      Cervical back: Normal range of motion and neck supple.   Lymphadenopathy:      Cervical: Cervical adenopathy present.   Skin:     General: Skin is warm and dry.   Neurological:      General: No focal deficit present.      Mental Status: She is alert and oriented to person, place, and time.   Psychiatric:         Mood and Affect: Mood normal.         Behavior: Behavior normal.         Procedures    Point of Care Test & Imaging Results from this visit  Results for orders placed or performed in visit on 02/16/25   POCT Covid-19 Rapid Antigen   Result Value Ref Range    POC ROXANA-COV-2 AG  Presumptive negative test for SARS-CoV-2 (no antigen detected)     Presumptive negative test for SARS-CoV-2 (no antigen detected)   POCT Influenza A/B manually resulted   Result Value Ref Range    POC Rapid Influenza A Positive (A) Negative    POC Rapid Influenza B Negative Negative      No results found.    Diagnostic study results (if any) were reviewed by YULISSA Tabares.    Assessment/Plan   Allergies, medications, history, and pertinent labs/EKGs/Imaging reviewed by YULISSA Tabares.     Medical Decision Making    Rapid covid- neg  Influenza A- POSITIVE  Tamiflu prescribed at   Pt managing secretions, airway intact. There or no signs of PTA/TA, trismus, retropharyngeal abscess or epiglotitis. No signs of  osteomyelitis, orbital cellulitis or other complications of sinusitis at this point in time. CXR deferred at this time as lungs are CTA and there is no signs of respiratory distress. SpO2 >94% on RA. There is no reported SOB, CP, CASTILLO, pleuritic pain, fever. Clinical suspicions of pneumonia or other cardiorespiratory catastrophe at this time are low. Pt is ambulating without difficulty showing no signs of hypoxia. Given the pt underlying risk factors, and exam will err on the side of caution and send over tamiflu . Pt well-hydrated, nontoxic and there no signs of clinical deterioration. Patient well hydrated, neurovascularly intact. Advised normal saline mist spray TID, flonase daily, antihistamine daily, vit d3/c/zinc/elderberry, probiotics for gut health, and if s/s persist after 48 hours to f/u PCP    Follow up Care: Pt instructed to follow-up with PCP or other appropriate clinician within 24 to 48 hours. Report to ED if there is any development in worsening pain, difficulty swallowing, change in phonation, fever, chills, neck pain, photophobia, headache, neck stiffness, chest pain, abdominal pain, vomiting, syncope, hemoptysis, leg swelling SOB, fever, facial swelling, eye pain, periorbital swelling/erythema, or any new signs or sx.     The patient was educated regarding diagnosis, supportive care, OTC and Rx medications. The patient was given the opportunity to ask questions prior to discharge. They verbalized understanding of my discussion of the plans for treatment, expected course, indications to return to UC or seek further evaluation in ED, and the need for timely follow up as directed.     Orders and Diagnoses  Diagnoses and all orders for this visit:  Influenza A  -     oseltamivir (Tamiflu) 75 mg capsule; Take 1 capsule (75 mg) by mouth every 12 hours for 5 days.  -     benzonatate (Tessalon) 200 mg capsule; Take 1 capsule (200 mg) by mouth 3 times a day as needed for cough for up to 7 days. Do not crush  or chew.  -     guaiFENesin (Mucinex) 1,200 mg tablet extended release 12hr; Take 1 tablet (1,200 mg) by mouth every 12 hours if needed (cough) for up to 7 days.  Acute cough  -     POCT Covid-19 Rapid Antigen  -     POCT Influenza A/B manually resulted      Medical Admin Record      Patient disposition: Home    Electronically signed by YULISSA Tabares  8:28 AM

## 2025-02-17 ENCOUNTER — APPOINTMENT (OUTPATIENT)
Dept: RADIOLOGY | Facility: CLINIC | Age: 55
End: 2025-02-17
Payer: COMMERCIAL

## 2025-02-19 ENCOUNTER — OFFICE VISIT (OUTPATIENT)
Dept: PRIMARY CARE | Facility: CLINIC | Age: 55
End: 2025-02-19
Payer: COMMERCIAL

## 2025-02-19 VITALS
HEART RATE: 81 BPM | HEIGHT: 64 IN | WEIGHT: 139 LBS | DIASTOLIC BLOOD PRESSURE: 84 MMHG | BODY MASS INDEX: 23.73 KG/M2 | OXYGEN SATURATION: 98 % | SYSTOLIC BLOOD PRESSURE: 143 MMHG

## 2025-02-19 DIAGNOSIS — B96.89 BACTERIAL SINUSITIS: Primary | ICD-10-CM

## 2025-02-19 DIAGNOSIS — B37.9 YEAST INFECTION: ICD-10-CM

## 2025-02-19 DIAGNOSIS — J32.9 BACTERIAL SINUSITIS: Primary | ICD-10-CM

## 2025-02-19 PROCEDURE — 1036F TOBACCO NON-USER: CPT | Performed by: STUDENT IN AN ORGANIZED HEALTH CARE EDUCATION/TRAINING PROGRAM

## 2025-02-19 PROCEDURE — 99213 OFFICE O/P EST LOW 20 MIN: CPT | Performed by: STUDENT IN AN ORGANIZED HEALTH CARE EDUCATION/TRAINING PROGRAM

## 2025-02-19 PROCEDURE — 3008F BODY MASS INDEX DOCD: CPT | Performed by: STUDENT IN AN ORGANIZED HEALTH CARE EDUCATION/TRAINING PROGRAM

## 2025-02-19 RX ORDER — AMOXICILLIN AND CLAVULANATE POTASSIUM 875; 125 MG/1; MG/1
875 TABLET, FILM COATED ORAL 2 TIMES DAILY
Qty: 14 TABLET | Refills: 0 | Status: SHIPPED | OUTPATIENT
Start: 2025-02-19 | End: 2025-02-26

## 2025-02-19 RX ORDER — FLUCONAZOLE 150 MG/1
TABLET ORAL
Qty: 2 TABLET | Refills: 0 | Status: SHIPPED | OUTPATIENT
Start: 2025-02-19

## 2025-02-19 ASSESSMENT — PATIENT HEALTH QUESTIONNAIRE - PHQ9
1. LITTLE INTEREST OR PLEASURE IN DOING THINGS: NOT AT ALL
2. FEELING DOWN, DEPRESSED OR HOPELESS: NOT AT ALL
SUM OF ALL RESPONSES TO PHQ9 QUESTIONS 1 AND 2: 0

## 2025-02-19 ASSESSMENT — PAIN SCALES - GENERAL: PAINLEVEL_OUTOF10: 6

## 2025-02-19 NOTE — PROGRESS NOTES
Subjective    Kellie E Schwabl is a 54 y.o. female [place] [facility] [place]o Rolon on 25 for sick visit for flu-like symptoms and sinus issues. Pt is a former pt of Dr Boateng yet to establish care.    HPI:   Acute Concerns:   #Flu A positive  #sinus infection  - initially symptoms of cough and sore throat, now improved with tamiflu/supportive care  - now with 2 days of intense of sinus pain/pressure and bilateral ear pain   - denies hearing symptoms or ringing in ear  - some nasal drainage- clear, not coughing up any significant mucus   - no continued fevers/chills  [  ] continue tamiflu course  [  ] encouraged supportive measures with daily antihistamine, daily steroid nasal spray, mucinex as needed  [  ] Augmentin BID 7 days for c/f otitis media  [  ] fluconazole script sent in case of yeast infection given abx    Past Medical History:   Past Medical History:   Diagnosis Date    Encounter for nonprocreative screening for genetic disease carrier status 2016    BRCA negative     Subspecialty Medical Care: Obgyn  Interval Subspecialty Care Visit History:     Past Surgical History:   Past Surgical History:   Procedure Laterality Date     SECTION, CLASSIC  2016     Section    CHOLECYSTECTOMY  2016    Cholecystectomy Laparoscopic    OOPHORECTOMY  2016    Oophorectomy - Unilateral (Removal Of One Ovary)    OTHER SURGICAL HISTORY  2016    Hysterectomy Robotic-Assisted    OTHER SURGICAL HISTORY  2016    Neuroplasty With Transposition Of Ulnar Nerve - At Elbow    TUBAL LIGATION  2016    Tubal Ligation     Surgery/Location/Date:     Medications:     Current Outpatient Medications:     benzonatate (Tessalon) 200 mg capsule, Take 1 capsule (200 mg) by mouth 3 times a day as needed for cough for up to 7 days. Do not crush or chew., Disp: 30 capsule, Rfl: 0    estradiol (Vivelle-DOT) 0.05 mg/24 hr patch, Place 1 patch on the skin 1 (one) time per  "week., Disp: 12 patch, Rfl: 3    estradiol (Vivelle-DOT) 0.05 mg/24 hr patch, Place 1 patch over 96 hours on the skin 2 times a week., Disp: 24 patch, Rfl: 3    guaiFENesin (Mucinex) 1,200 mg tablet extended release 12hr, Take 1 tablet (1,200 mg) by mouth every 12 hours if needed (cough) for up to 7 days., Disp: 14 tablet, Rfl: 0    multivitamin tablet, Take by mouth., Disp: , Rfl:     ofloxacin (Ocuflox) 0.3 % ophthalmic solution, Administer 1 drop into the left eye 4 times a day. (Patient not taking: Reported on 2/16/2025), Disp: 5 mL, Rfl: 0    oseltamivir (Tamiflu) 75 mg capsule, Take 1 capsule (75 mg) by mouth every 12 hours for 5 days., Disp: 10 capsule, Rfl: 0  Pharmacy: [facility] [place] [address]    Allergies:   Allergies   Allergen Reactions    Codeine Other    Erythromycin Nausea/vomiting     Immunizations:     Family History:   Family History   Problem Relation Name Age of Onset    Stroke Father      Breast cancer Sister          2 sisters, both in 40s when diagnosed       Social History: - will address at [facility] care visit   Home/Living Situation/Falls/Safety Assessment:   Education/Employment/Work/Vocational:   Activities:   Drug Use:   Diet:   Depression/Anxiety:   Sexuality/Contraception/Menstrual History:   Sleep:     Patient Information:  Health Insurance:  Employee Medical   Transportation: drives self  Healthcare POA/Guardian: , Tariq  Contact Information:   Other:     Visit Vitals  /84 (BP Location: Right arm, Patient Position: Sitting, BP Cuff Size: Adult)   Pulse 81   Ht 1.626 m (5' 4\")   Wt 63 kg (139 lb)   LMP  (LMP Unknown)   SpO2 98%   BMI 23.86 kg/m²   OB Status Hysterectomy   Smoking Status Never   BSA 1.69 m²        PHYSICAL EXAM:   General: well appearing, NAD, pleasant and engaged in encounter    HEENT: NCAT, MMM, no posterior pharyngeal erythema/exudates appreciated; bilateral swollen nasal turbinates; no light reflex appreciated on R>L, pain on examination of ear "   CV: RRR, no m/r/g  PULM: CTAB, non-labored respirations   ABD: soft, NT, ND, + bowel sounds   : no suprapubic or CVA tenderness   EXT: WWP, no significant edema   SKIN: no rashes noted   NEURO: A&Ox4, symmetric facies, no gross motor or sensory deficits, normal gait  PSYCH: pleasant mood, appropriate affect     Assessment/Plan    Kellie E Schwabl is a 54 y.o. F seen in Clinic at [facility] [place] by Dr. Jericho Rolon on 25 for sick visit for flu-like symptoms and sinus issues. Pt is a former pt of Dr Boateng yet to establish care.    #Flu A positive  #sinus infection  - initially symptoms of cough and sore throat, now improved with tamiflu/supportive care  - now with 2 days of intense of sinus pain/pressure and bilateral ear pain   - denies hearing symptoms or ringing in ear  - some nasal drainage- clear, not coughing up any significant mucus   - no continued fevers/chills  [  ] continue tamiflu course  [  ] encouraged supportive measures with daily antihistamine, daily steroid nasal spray, mucinex as needed  [  ] Augmentin BID 7 days for c/f otitis media  [  ] fluconazole script sent in case of yeast infection given abx    #Health Maintenance    Cancer Screening  - Cervical Cancer Screening: last pap smear/HPV testing: hysterectomy  - Mammography: Last 10/2023, due now, ordered pending   - Colorectal Cancer Screenin, due   - Lung Cancer Screening: not indic    Laboratory Screening- address at established care visit   - Lipid Screen:   - ASCVD Score:   - A1C, glucose screen:   - STI, HIV, Hep B screen:   - Hep C screen:     Imaging Screening  - Osteoporosis/DEXA screening: due at 65    Immunizations:   - Influenza - UTD fall   - COVID - UTD fall   - Tdap- done , due    - Prevnar, Pneumovax: not done, consider prevnar 20 in future  - Shingrix: consider further discussion at next visit     Other Screening  - Health Literacy Assessment: excellent  - Depression screen:   - Home safety/partner  violence screen:   - Hearing/Vision screens:   - Alcohol/tobacco/drug use screen:   - Healthcare POA/Advanced Directives:     Referrals: augmentin, fluconazole  Return to clinic to establish care.     Patient Discussion:    Please call back the office with any questions at [phone]. In the case of an emergency, please call 911 or go to the nearest Emergency Department.      Jericho Rolon MD  Internal Medicine-Pediatrics  [facility] [place] [address], [address]  P: [phone], F: [phone]

## 2025-03-05 ENCOUNTER — HOSPITAL ENCOUNTER (OUTPATIENT)
Dept: RADIOLOGY | Facility: CLINIC | Age: 55
Discharge: HOME | End: 2025-03-05
Payer: COMMERCIAL

## 2025-03-05 VITALS — HEIGHT: 64 IN | BODY MASS INDEX: 23.73 KG/M2 | WEIGHT: 139 LBS

## 2025-03-05 DIAGNOSIS — Z12.31 BREAST CANCER SCREENING BY MAMMOGRAM: ICD-10-CM

## 2025-03-05 DIAGNOSIS — R05.8 POST-VIRAL COUGH SYNDROME: Primary | ICD-10-CM

## 2025-03-05 PROCEDURE — 77063 BREAST TOMOSYNTHESIS BI: CPT | Performed by: RADIOLOGY

## 2025-03-05 PROCEDURE — 77067 SCR MAMMO BI INCL CAD: CPT

## 2025-03-05 PROCEDURE — 77067 SCR MAMMO BI INCL CAD: CPT | Performed by: RADIOLOGY

## 2025-03-05 RX ORDER — ALBUTEROL SULFATE 90 UG/1
2 INHALANT RESPIRATORY (INHALATION) EVERY 4 HOURS PRN
Qty: 18 G | Refills: 1 | Status: SHIPPED | OUTPATIENT
Start: 2025-03-05 | End: 2026-03-05

## 2025-03-06 DIAGNOSIS — R92.8 ABNORMAL MAMMOGRAM: Primary | ICD-10-CM

## 2025-03-12 ENCOUNTER — APPOINTMENT (OUTPATIENT)
Dept: PRIMARY CARE | Facility: CLINIC | Age: 55
End: 2025-03-12
Payer: COMMERCIAL

## 2025-03-12 VITALS
OXYGEN SATURATION: 97 % | HEART RATE: 72 BPM | SYSTOLIC BLOOD PRESSURE: 115 MMHG | BODY MASS INDEX: 24.53 KG/M2 | WEIGHT: 143 LBS | DIASTOLIC BLOOD PRESSURE: 73 MMHG

## 2025-03-12 DIAGNOSIS — R06.83 SNORING: ICD-10-CM

## 2025-03-12 DIAGNOSIS — Z23 IMMUNIZATION DUE: ICD-10-CM

## 2025-03-12 DIAGNOSIS — Z13.220 NEED FOR LIPID SCREENING: ICD-10-CM

## 2025-03-12 DIAGNOSIS — E78.5 DYSLIPIDEMIA: ICD-10-CM

## 2025-03-12 DIAGNOSIS — Z00.00 HEALTH MAINTENANCE EXAMINATION: Primary | ICD-10-CM

## 2025-03-12 DIAGNOSIS — E55.9 MILD VITAMIN D DEFICIENCY: ICD-10-CM

## 2025-03-12 DIAGNOSIS — R91.1 PULMONARY NODULE: ICD-10-CM

## 2025-03-12 DIAGNOSIS — N20.0 NEPHROLITHIASIS: ICD-10-CM

## 2025-03-12 LAB
25(OH)D3+25(OH)D2 SERPL-MCNC: 49 NG/ML (ref 30–100)
ALBUMIN SERPL-MCNC: 4.8 G/DL (ref 3.6–5.1)
ALP SERPL-CCNC: 64 U/L (ref 37–153)
ALT SERPL-CCNC: 21 U/L (ref 6–29)
ANION GAP SERPL CALCULATED.4IONS-SCNC: 10 MMOL/L (CALC) (ref 7–17)
APPEARANCE UR: CLEAR
AST SERPL-CCNC: 21 U/L (ref 10–35)
BASOPHILS # BLD AUTO: 31 CELLS/UL (ref 0–200)
BASOPHILS NFR BLD AUTO: 0.8 %
BILIRUB SERPL-MCNC: 0.7 MG/DL (ref 0.2–1.2)
BILIRUB UR QL STRIP: NEGATIVE
BUN SERPL-MCNC: 12 MG/DL (ref 7–25)
CALCIUM SERPL-MCNC: 9.4 MG/DL (ref 8.6–10.4)
CHLORIDE SERPL-SCNC: 103 MMOL/L (ref 98–110)
CHOLEST SERPL-MCNC: 206 MG/DL
CHOLEST/HDLC SERPL: 2.9 (CALC)
CO2 SERPL-SCNC: 26 MMOL/L (ref 20–32)
COLOR UR: YELLOW
CREAT SERPL-MCNC: 0.63 MG/DL (ref 0.5–1.03)
EGFRCR SERPLBLD CKD-EPI 2021: 105 ML/MIN/1.73M2
EOSINOPHIL # BLD AUTO: 121 CELLS/UL (ref 15–500)
EOSINOPHIL NFR BLD AUTO: 3.1 %
ERYTHROCYTE [DISTWIDTH] IN BLOOD BY AUTOMATED COUNT: 12 % (ref 11–15)
GLUCOSE SERPL-MCNC: 90 MG/DL (ref 65–99)
GLUCOSE UR QL STRIP: NEGATIVE
HCT VFR BLD AUTO: 43.5 % (ref 35–45)
HDLC SERPL-MCNC: 70 MG/DL
HGB BLD-MCNC: 14.5 G/DL (ref 11.7–15.5)
HGB UR QL STRIP: NEGATIVE
KETONES UR QL STRIP: NEGATIVE
LDLC SERPL CALC-MCNC: 120 MG/DL (CALC)
LEUKOCYTE ESTERASE UR QL STRIP: NEGATIVE
LYMPHOCYTES # BLD AUTO: 1658 CELLS/UL (ref 850–3900)
LYMPHOCYTES NFR BLD AUTO: 42.5 %
MCH RBC QN AUTO: 32 PG (ref 27–33)
MCHC RBC AUTO-ENTMCNC: 33.3 G/DL (ref 32–36)
MCV RBC AUTO: 96 FL (ref 80–100)
MONOCYTES # BLD AUTO: 312 CELLS/UL (ref 200–950)
MONOCYTES NFR BLD AUTO: 8 %
NEUTROPHILS # BLD AUTO: 1778 CELLS/UL (ref 1500–7800)
NEUTROPHILS NFR BLD AUTO: 45.6 %
NITRITE UR QL STRIP: NEGATIVE
NONHDLC SERPL-MCNC: 136 MG/DL (CALC)
PH UR STRIP: 5.5 [PH] (ref 5–8)
PLATELET # BLD AUTO: 258 THOUSAND/UL (ref 140–400)
PMV BLD REES-ECKER: 9.9 FL (ref 7.5–12.5)
POTASSIUM SERPL-SCNC: 4.5 MMOL/L (ref 3.5–5.3)
PROT SERPL-MCNC: 7.6 G/DL (ref 6.1–8.1)
PROT UR QL STRIP: NEGATIVE
RBC # BLD AUTO: 4.53 MILLION/UL (ref 3.8–5.1)
SODIUM SERPL-SCNC: 139 MMOL/L (ref 135–146)
SP GR UR STRIP: 1.01 (ref 1–1.03)
TRIGL SERPL-MCNC: 70 MG/DL
TSH SERPL-ACNC: 2.23 MIU/L
WBC # BLD AUTO: 3.9 THOUSAND/UL (ref 3.8–10.8)

## 2025-03-12 PROCEDURE — 99396 PREV VISIT EST AGE 40-64: CPT | Performed by: STUDENT IN AN ORGANIZED HEALTH CARE EDUCATION/TRAINING PROGRAM

## 2025-03-12 PROCEDURE — G2211 COMPLEX E/M VISIT ADD ON: HCPCS | Performed by: STUDENT IN AN ORGANIZED HEALTH CARE EDUCATION/TRAINING PROGRAM

## 2025-03-12 PROCEDURE — 90471 IMMUNIZATION ADMIN: CPT | Performed by: STUDENT IN AN ORGANIZED HEALTH CARE EDUCATION/TRAINING PROGRAM

## 2025-03-12 PROCEDURE — 1036F TOBACCO NON-USER: CPT | Performed by: STUDENT IN AN ORGANIZED HEALTH CARE EDUCATION/TRAINING PROGRAM

## 2025-03-12 PROCEDURE — 90677 PCV20 VACCINE IM: CPT | Performed by: STUDENT IN AN ORGANIZED HEALTH CARE EDUCATION/TRAINING PROGRAM

## 2025-03-12 ASSESSMENT — PATIENT HEALTH QUESTIONNAIRE - PHQ9
SUM OF ALL RESPONSES TO PHQ9 QUESTIONS 1 AND 2: 0
1. LITTLE INTEREST OR PLEASURE IN DOING THINGS: NOT AT ALL
2. FEELING DOWN, DEPRESSED OR HOPELESS: NOT AT ALL

## 2025-03-12 ASSESSMENT — PAIN SCALES - GENERAL: PAINLEVEL_OUTOF10: 0-NO PAIN

## 2025-03-12 NOTE — PROGRESS NOTES
Kellie E Schwabl is a 54 y.o. female [place] in [place] [facility] [place] Jericho Rolon on 25 for routine care, as well as for management of the following chronic medical conditions: nephrolithiasis, post-menopausal (s/p hysterectomy, BSO). Patient presents today for CPE.     #Postmenopausal  #s/p Hysterectomy and BSO (around )  - estradiol patch  - follows with GYN, Cristy     #Abnormal Mammogram   - L breast; pending diagnostic mammogram and US   - family history of breast Ca     #Nephrolithiasis   #L Kidney Cyst   [  ] KUB to assess stone burden, UA, RFP     #?Pulmonary Nodule   [  ] follow up imaging; to be done at time of CAC scoring     #Family History of CVA  [  ] CAC scoring, lipid panel    #Snoring  - prior home sleep study negative  - seen by ENT, advised in center study, not performed  [  ] updated ENT referral today     Past Medical History:   Past Medical History:   Diagnosis Date    BRCA1 negative [id number]    BRCA2 negative [id number]    Encounter for nonprocreative screening for genetic disease carrier status 2016    BRCA negative     Subspecialty Medical Care: GYN    Past Surgical History: Bunion surgery   Past Surgical History:   Procedure Laterality Date     SECTION, CLASSIC  2016     Section    CHOLECYSTECTOMY  2016    Cholecystectomy Laparoscopic    HYSTERECTOMY      OOPHORECTOMY  2016    Oophorectomy - Unilateral (Removal Of One Ovary)    OTHER SURGICAL HISTORY  2016    Hysterectomy Robotic-Assisted    OTHER SURGICAL HISTORY  2016    Neuroplasty With Transposition Of Ulnar Nerve - At Elbow    TUBAL LIGATION  2016    Tubal Ligation     Medications:  Current Outpatient Medications:     albuterol (Ventolin HFA) 90 mcg/actuation inhaler, Inhale 2 puffs every 4 hours if needed for wheezing or shortness of breath., Disp: 18 g, Rfl: 1    estradiol (Vivelle-DOT) 0.05 mg/24 hr patch, Place 1 patch on the skin 1 (one) time per week., Disp:  12 patch, Rfl: 3    estradiol (Vivelle-DOT) 0.05 mg/24 hr patch, Place 1 patch over 96 hours on the skin 2 times a week., Disp: 24 patch, Rfl: 3    multivitamin tablet, Take by mouth., Disp: , Rfl:   Pharmacy: [facility]     Allergies:  Allergies   Allergen Reactions    Codeine Other    Erythromycin Nausea/vomiting   Reactions: GI upset     Immunizations:   - flu UTD 2024  - UTD COVID 2024  - Tdap due 2028  - Shingrix complete  - PCV-20 today     Family History:   - father with HTN, overweight  Family History   Problem Relation Name Age of Onset    Stroke Father      Breast cancer Sister          2 sisters, both in 40s when diagnosed     Social History:   Home/Living Situation/Falls/Safety Assessment: lives at home with ; two daughters (one in [place], youngest [employer] in [place])   Education/Employment/Work/Vocational: works at [employer]   Activities: physical activity; , weight bearing exercise   Drug Use: never smoker, 2-3 glasses per week   Diet: no dietary concerns or restrictions   Depression/Anxiety: no concerns   Sexuality/Contraception/Menstrual History: sexually active with    Sleep: sleep dentist; mouthpiece; previously broke nose    Patient Information:  Health Insurance:   Transportation: drives   Healthcare POA/Guardian:    Contact Information: correct in EMR    Visit Vitals  /73 (BP Location: Right arm, Patient Position: Sitting, BP Cuff Size: Adult)   Pulse 72   Wt 64.9 kg (143 lb)   LMP  (LMP Unknown)   SpO2 97%   BMI 24.53 kg/m²   OB Status Hysterectomy   Smoking Status Never   BSA 1.71 m²      PHYSICAL EXAM:   General: well appearing, NAD, pleasant and engaged in encounter    HEENT: NCAT, MMM  CV: RRR, no m/r/g  PULM: CTAB, non-labored respirations   ABD: soft, NT, ND, + bowel sounds   : no suprapubic or CVA tenderness   EXT: WWP, no significant edema   SKIN: no rashes noted   NEURO: A&Ox4, symmetric facies, no gross motor or sensory  deficits, normal gait  PSYCH: pleasant mood, appropriate affect     Assessment/Plan    Kellie E Schwabl is a 54 y.o. female seen in Clinic at [facility] [place] by Dr. Jericho Rolon on 25 for routine care, as well as for management of the following chronic medical conditions:  nephrolithiasis, post-menopausal (s/p hysterectomy, BSO). Patient presents today for CPE.     #Postmenopausal  #s/p Hysterectomy and BSO (around )  - estradiol patch  - follows with GYNCristy     #Abnormal Mammogram   - L breast; pending diagnostic mammogram and US   - family history of breast Ca     #Nephrolithiasis   #L Kidney Cyst   [  ] KUB to assess stone burden, UA, RFP     #?Pulmonary Nodule   [  ] follow up imaging; to be done at time of CAC scoring     #Family History of CVA  [  ] CAC scoring, lipid panel    #Snoring  - prior home sleep study negative  - seen by ENT, advised in center study, not performed  [  ] updated ENT referral today     Cancer Screening  - Cervical Cancer Screening: last pap smear/HPV testing: hysterectomy  - Mammography: as above   - Colorectal Cancer Screenin, due   - Lung Cancer Screening: not indicated     Laboratory Screening- address at established care visit   - Lipid Screen: labs today   - ASCVD Score: labs today   - A1C, glucose screen: labs today   - STI, HIV, Hep B screen: defer  - Hep C screen: defer    Imaging Screening  - Osteoporosis/DEXA screening: due at 65    Immunizations:   - Influenza - UTD fall   - COVID - UTD fall   - Tdap- done , due    - Prevnar, Pneumovax: PCV-20 today   - Shingrix: series complete     Other Screening  - Health Literacy Assessment: excellent  - Depression screen:   - Home safety/partner violence screen:   - Hearing/Vision screens:   - Alcohol/tobacco/drug use screen:   - Healthcare POA/Advanced Directives:     Return to clinic annually for follow-up/CPE, sooner if acute issues arise.     Patient Discussion:    Please call back the office with  any questions at [phone]. In the case of an emergency, please call 911 or go to the nearest Emergency Department.      Jericho Rolon MD  Internal Medicine-Pediatrics  [facility] [place] [address], [address]  P: [phone], F: [phone]

## 2025-03-12 NOTE — PATIENT INSTRUCTIONS
"Thank you for coming in today!    Labs in [address]  Blood and urine    Radiology is [address] in this building  Imaging Studies:   - Abdominal X-Ray to assess kidney stone \"burden\"   - CT Chest and CT Cardiac Calcium scoring--call [phone] to schedule these; ask them to do together/at the same time; this will better assess cardiac risk, as well as follow up the questionable lung nodule seen on the scan from 2023    Follow up mammogram as planned later this week    ENT referral to Dr. Roberto Rosales  [phone]    Pneumonia (Prevnar-20) vaccine today     Follow up annually, sooner if any issues arise!    Best,  Dr. BURGER  "

## 2025-03-14 ENCOUNTER — HOSPITAL ENCOUNTER (OUTPATIENT)
Dept: RADIOLOGY | Facility: CLINIC | Age: 55
Discharge: HOME | End: 2025-03-14
Payer: COMMERCIAL

## 2025-03-14 DIAGNOSIS — R92.8 ABNORMAL MAMMOGRAM: ICD-10-CM

## 2025-03-14 PROCEDURE — 77061 BREAST TOMOSYNTHESIS UNI: CPT | Mod: LT

## 2025-03-14 PROCEDURE — 77061 BREAST TOMOSYNTHESIS UNI: CPT | Mod: LEFT SIDE | Performed by: STUDENT IN AN ORGANIZED HEALTH CARE EDUCATION/TRAINING PROGRAM

## 2025-03-14 PROCEDURE — 77065 DX MAMMO INCL CAD UNI: CPT | Mod: LEFT SIDE | Performed by: STUDENT IN AN ORGANIZED HEALTH CARE EDUCATION/TRAINING PROGRAM

## 2025-03-17 ENCOUNTER — HOSPITAL ENCOUNTER (OUTPATIENT)
Dept: RADIOLOGY | Facility: CLINIC | Age: 55
Discharge: HOME | End: 2025-03-17
Payer: COMMERCIAL

## 2025-03-17 DIAGNOSIS — N20.0 NEPHROLITHIASIS: ICD-10-CM

## 2025-03-17 PROCEDURE — 74018 RADEX ABDOMEN 1 VIEW: CPT | Performed by: RADIOLOGY

## 2025-03-17 PROCEDURE — 74018 RADEX ABDOMEN 1 VIEW: CPT

## 2025-06-11 ENCOUNTER — HOSPITAL ENCOUNTER (OUTPATIENT)
Dept: RADIOLOGY | Facility: HOSPITAL | Age: 55
Discharge: HOME | End: 2025-06-11
Payer: COMMERCIAL

## 2025-06-11 DIAGNOSIS — E78.5 DYSLIPIDEMIA: ICD-10-CM

## 2025-06-11 DIAGNOSIS — R91.1 PULMONARY NODULE: ICD-10-CM

## 2025-06-11 PROCEDURE — 71250 CT THORAX DX C-: CPT

## 2025-06-11 PROCEDURE — 75571 CT HRT W/O DYE W/CA TEST: CPT

## 2025-06-11 PROCEDURE — 71250 CT THORAX DX C-: CPT | Performed by: STUDENT IN AN ORGANIZED HEALTH CARE EDUCATION/TRAINING PROGRAM

## 2025-09-10 ENCOUNTER — APPOINTMENT (OUTPATIENT)
Dept: OTOLARYNGOLOGY | Facility: CLINIC | Age: 55
End: 2025-09-10
Payer: COMMERCIAL

## 2026-03-12 ENCOUNTER — APPOINTMENT (OUTPATIENT)
Dept: PRIMARY CARE | Facility: CLINIC | Age: 56
End: 2026-03-12
Payer: COMMERCIAL